# Patient Record
Sex: FEMALE | Race: WHITE | Employment: PART TIME | URBAN - METROPOLITAN AREA
[De-identification: names, ages, dates, MRNs, and addresses within clinical notes are randomized per-mention and may not be internally consistent; named-entity substitution may affect disease eponyms.]

---

## 2017-01-04 ENCOUNTER — ROUTINE PRENATAL (OUTPATIENT)
Dept: MIDWIFE SERVICES | Age: 27
End: 2017-01-04

## 2017-01-04 VITALS — BODY MASS INDEX: 30.18 KG/M2 | DIASTOLIC BLOOD PRESSURE: 76 MMHG | SYSTOLIC BLOOD PRESSURE: 120 MMHG | WEIGHT: 165 LBS

## 2017-01-04 DIAGNOSIS — Z34.93 ENCOUNTER FOR SUPERVISION OF NORMAL PREGNANCY IN THIRD TRIMESTER, UNSPECIFIED GRAVIDITY: Primary | ICD-10-CM

## 2017-01-04 NOTE — PROGRESS NOTES
Centering Session 7    Feeling well. Denies complaints. Reports good FM. Denies UC's, menstrual-like cramping, VB or LOF. Complaints of excess thirst.  Drinking 8 glasses of water daily. VSS. See prenatal flowsheet for maternal and fetal exam    IUP at 36w1d  S=D    Plan:  GBS today  Extra visit next week  [place] care reviewed, including sleeping safety, bathing safety, feeding safety, immunizations, diapering, responding to infant cues, circumcision, SIDs, and other general safety concerns r/t /. Discussed selection of Pediatrician. Reviewed fetal movement counts.   RTO 2 weeks

## 2017-01-08 LAB
B-HEM STREP SPEC QL CULT: NEGATIVE
GRBS, EXTERNAL: NEGATIVE

## 2017-01-11 ENCOUNTER — ROUTINE PRENATAL (OUTPATIENT)
Dept: MIDWIFE SERVICES | Age: 27
End: 2017-01-11

## 2017-01-11 VITALS
WEIGHT: 165 LBS | HEART RATE: 108 BPM | BODY MASS INDEX: 30.36 KG/M2 | DIASTOLIC BLOOD PRESSURE: 82 MMHG | SYSTOLIC BLOOD PRESSURE: 135 MMHG | TEMPERATURE: 98.1 F | HEIGHT: 62 IN

## 2017-01-11 DIAGNOSIS — Z34.83 ENCOUNTER FOR SUPERVISION OF OTHER NORMAL PREGNANCY IN THIRD TRIMESTER: Primary | ICD-10-CM

## 2017-01-11 NOTE — PROGRESS NOTES
Chief Complaint   Patient presents with    Routine Prenatal Visit     37w1d     Visit Vitals    /82    Pulse (!) 108    Temp 98.1 °F (36.7 °C) (Oral)    Ht 5' 2\" (1.575 m)    Wt 165 lb (74.8 kg)    BMI 30.18 kg/m2     1. Have you been to the ER, urgent care clinic since your last visit? Hospitalized since your last visit? No    2. Have you seen or consulted any other health care providers outside of the [address] [place] [address] since your last visit? Include any pap smears or colon screening.  No

## 2017-01-11 NOTE — PROGRESS NOTES
S: Feeling well. Consistent, daily fetal mvmt. No ctxs, LOF, or vaginal bldng. Continues to reports increased thirst. Also reports occasional tingling in bilateral hands. States it is worse in the morning and improves throughout day. States she is having occasional BH contractions, not painful. Denies LOF and VB.    O: See prenatal flowsheet for maternal and fetal exam  Blood pressure 135/82, pulse (!) 108, temperature 98.1 °F (36.7 °C), temperature source Oral, height 5' 2\" (1.575 m), weight 165 lb (74.8 kg). A:  37w1d   Size=Dates    P:   Reviewed normal GDM screen and normalcy of increased thirst during pregnancy due to increased blood volume. Encouraged her to continue hydrating liberally. Discussed caporal tunnel symptoms in pregnancy and normalcy. Discussed wrist braces at night to help decrease symptoms. Declines at this time due to finances. Reviewed BH contractions and normalcy of them. Discussed labor and s/s of it. Verbalized understanding. Reviewed common pregnancy changes and discomfort as well as comfort measures. See prenatal checklist for other topics covered during visit today  RTO in 1 weeks for centering.

## 2017-01-16 ENCOUNTER — ROUTINE PRENATAL (OUTPATIENT)
Dept: MIDWIFE SERVICES | Age: 27
End: 2017-01-16

## 2017-01-16 VITALS
HEIGHT: 62 IN | HEART RATE: 98 BPM | TEMPERATURE: 97.6 F | BODY MASS INDEX: 30.18 KG/M2 | WEIGHT: 164 LBS | DIASTOLIC BLOOD PRESSURE: 86 MMHG | SYSTOLIC BLOOD PRESSURE: 129 MMHG

## 2017-01-16 DIAGNOSIS — Z3A.37 37 WEEKS GESTATION OF PREGNANCY: ICD-10-CM

## 2017-01-16 DIAGNOSIS — A63.0 CONDYLOMA: ICD-10-CM

## 2017-01-16 DIAGNOSIS — Z34.03 ENCOUNTER FOR SUPERVISION OF NORMAL FIRST PREGNANCY IN THIRD TRIMESTER: Primary | ICD-10-CM

## 2017-01-16 DIAGNOSIS — Z23 ENCOUNTER FOR IMMUNIZATION: ICD-10-CM

## 2017-01-16 NOTE — PROGRESS NOTES
RETURN OB VISIT SUMMARY    Subjective:    31 y.o.    37w6d with has no unusual complaints except for \"growth on mons\", Denies LOF, dysuria, bleeding or cramping. Reports normal fetal movement. She istolerating her diet and is taking PNV on a regular basis. She has not noted a change in fetal position. Objective:    Physical Exam:  Visit Vitals    /86    Pulse 98    Temp 97.6 °F (36.4 °C) (Oral)    Ht 5' 2\" (1.575 m)    Wt 164 lb (74.4 kg)    LMP 2016 (Approximate)    BMI 30 kg/m2     Also, see prenatal vitals flowsheet. Patient without distress. Fundus: soft and non tender   MONS:  Dark raised lesion midline approximately 2 to 2.5 cm cephalad of vaginal opening. swabbed for identification  Perineum: blood absent, amniotic fluid absent    Lab Results   Component Value Date/Time    HGB 11.9 11/10/2016 10:18 AM    HCT 35.6 11/10/2016 10:18 AM    PLATELET 699  10:18 AM    Gestational Diabetes Screen 124 11/10/2016 10:18 AM    Hgb, External 13.8 2016    Platelet cnt., External 286 x10E3/uL 2016    Antibody screen, External Negative 2016       Immunization History   Administered Date(s) Administered    Influenza Vaccine (Quad) PF 2016    Tdap 2017     Flu Shot given  Tdap 28-32 wks given    Assessment/Plan:    Michelle Vergara was seen today for routine prenatal visit. Diagnoses and all orders for this visit:    Encounter for supervision of normal first pregnancy in third trimester    Condyloma  -     IGP, APTIMA HPV    Encounter for immunization  -     TETANUS, [address] [place] [address] (TDAP), IN INDIVIDS. >=7, IM  -     UT IMMUNIZ ADMIN,1 SINGLE/COMB VAC/TOXOID    37 weeks gestation of pregnancy      Follow-up Disposition:  Return in about 1 week (around 2017) for RTOB.

## 2017-01-16 NOTE — PROGRESS NOTES
Verbal order for HPV of suprapubic per dr. Joenathan Meckel  After obtaining consent, and per orders of Dr. Cindy Coker, injection of tdap given in right arm by Candice Lomeli RN. Patient instructed to remain in clinic for 20 minutes afterwards, and to report any adverse reaction to me immediately. Lot: [id number] Exp: 05/20/19 NDC: 28718-654-20 , VIS given.

## 2017-01-16 NOTE — PROGRESS NOTES
Chief Complaint   Patient presents with    Routine Prenatal Visit     37w6d     Visit Vitals    /86    Pulse 98    Temp 97.6 °F (36.4 °C) (Oral)    Ht 5' 2\" (1.575 m)    Wt 164 lb (74.4 kg)    BMI 30 kg/m2     1. Have you been to the ER, urgent care clinic since your last visit? Hospitalized since your last visit? No    2. Have you seen or consulted any other health care providers outside of the [address] [place] [address] since your last visit? Include any pap smears or colon screening.  No

## 2017-01-19 ENCOUNTER — TELEPHONE (OUTPATIENT)
Dept: MIDWIFE SERVICES | Age: 27
End: 2017-01-19

## 2017-01-19 LAB — SPECIMEN STATUS REPORT, ROLRST: NORMAL

## 2017-01-19 NOTE — TELEPHONE ENCOUNTER
This midwife noticed client has seen Dr Afua Parks for last appt and has next appt scheduled with him. Called client to see if there was an issue with midwifery care, or if she was desiring to change to Dr. Afua Parks. Client reports her friends and family have told her she needs a physician and she is considering switching to him. Discussed that changing is fine, but we need to know so that the appropriate person attends her labor and delivery. She verbalized understanding and reports she will think on it and let let us know soon.

## 2017-01-24 ENCOUNTER — ROUTINE PRENATAL (OUTPATIENT)
Dept: MIDWIFE SERVICES | Age: 27
End: 2017-01-24

## 2017-01-24 VITALS
HEART RATE: 99 BPM | WEIGHT: 164 LBS | DIASTOLIC BLOOD PRESSURE: 89 MMHG | BODY MASS INDEX: 30.18 KG/M2 | HEIGHT: 62 IN | SYSTOLIC BLOOD PRESSURE: 128 MMHG

## 2017-01-24 DIAGNOSIS — Z34.03 ENCOUNTER FOR SUPERVISION OF NORMAL FIRST PREGNANCY IN THIRD TRIMESTER: Primary | ICD-10-CM

## 2017-01-24 NOTE — PROGRESS NOTES
Chief Complaint   Patient presents with    Routine Prenatal Visit     39 weeks     Visit Vitals    /89    Pulse 99    Ht 5' 2\" (1.575 m)    Wt 164 lb (74.4 kg)    BMI 30 kg/m2     1. Have you been to the ER, urgent care clinic since your last visit? Hospitalized since your last visit? No    2. Have you seen or consulted any other health care providers outside of the [address] [place] [address] since your last visit? Include any pap smears or colon screening. No     Here today for a routine prenatal visit and she has no complaints or concerns.   She would like her cervix checked since she didn't have it checked last week

## 2017-01-24 NOTE — PATIENT INSTRUCTIONS
Week 39 of Your Pregnancy: Care Instructions  Your Care Instructions    During these final weeks, you may feel anxious to see your new baby. [place] babies often look different from what you see in pictures or movies. Right after birth, their heads may have a strange shape. Their eyes may be puffy. And their genitals may be swollen. They may also have very dry skin, or red marks on the eyelids, nose, or neck. Still, most parents think their babies are beautiful. Follow-up care is a key part of your treatment and safety. Be sure to make and go to all appointments, and call your doctor if you are having problems. It's also a good idea to know your test results and keep a list of the medicines you take. How can you care for yourself at home? Prepare to breastfeed  · If you are breastfeeding, continue to eat healthy foods. · Avoid alcohol, cigarettes, and drugs. This includes prescription and over-the-counter medicines. · You can help prevent sore nipples if you feed your baby in the correct position. Nurses will help you learn to do this. · Your  will need to be fed about every 1½ to 3 hours. Choose the right birth control after your baby is born  · Women who are breastfeeding can still get pregnant. Use birth control if you don't want to get pregnant. · Intrauterine devices (IUDs) work for women who want to wait at least 2 years before getting pregnant again. They are safe to use while you are breastfeeding. · Depo-Provera can be used while you are breastfeeding. It is a shot you get every 3 months. · Birth control pills work well. But you need a different kind of pill while you are breastfeeding. And when you start taking these pills, you need to make sure to use another type of birth control until you start your second pack. · Diaphragms, cervical caps, tubal implants, and condoms with spermicide work less well after birth.  If you have a diaphragm or cervical cap, you will need to have it refitted. · Tubal ligation (tying your tubes) and vasectomy are both permanent. These are good options if you are sure you are done having children. Where can you learn more? Go to http://shara-mary.info/. Enter E616 in the search box to learn more about \"Week 39 of Your Pregnancy: Care Instructions. \"  Current as of: May 30, 2016  Content Version: 11.1  © 9169-6567 [employer]. Care instructions adapted under license by [employer] (which disclaims liability or warranty for this information). If you have questions about a medical condition or this instruction, always ask your healthcare professional. [address] any warranty or liability for your use of this information.

## 2017-01-27 LAB
SPECIMEN STATUS REPORT, ROLRST: NORMAL
SPECIMEN/REQUEST PROBLEM, 100867: NORMAL

## 2017-01-31 ENCOUNTER — ROUTINE PRENATAL (OUTPATIENT)
Dept: MIDWIFE SERVICES | Age: 27
End: 2017-01-31

## 2017-01-31 VITALS
WEIGHT: 169.8 LBS | SYSTOLIC BLOOD PRESSURE: 138 MMHG | BODY MASS INDEX: 31.25 KG/M2 | HEART RATE: 88 BPM | DIASTOLIC BLOOD PRESSURE: 83 MMHG | HEIGHT: 62 IN

## 2017-01-31 DIAGNOSIS — Z34.03 ENCOUNTER FOR SUPERVISION OF NORMAL FIRST PREGNANCY IN THIRD TRIMESTER: Primary | ICD-10-CM

## 2017-01-31 NOTE — PROGRESS NOTES
Chief Complaint   Patient presents with    Routine Prenatal Visit     40weeks     Visit Vitals    /83    Pulse 88    Ht 5' 2\" (1.575 m)    Wt 169 lb 12.8 oz (77 kg)    BMI 31.06 kg/m2     1. Have you been to the ER, urgent care clinic since your last visit? Hospitalized since your last visit? No    2. Have you seen or consulted any other health care providers outside of the [address] [place] [address] since your last visit? Include any pap smears or colon screening. No     Here today for a routine prenatal visit and she has no complaints or concerns.

## 2017-01-31 NOTE — MR AVS SNAPSHOT
Visit Information Date & Time Provider Department Dept. Phone Encounter #  
 1/31/2017  2:40 PM MD Guillermo Hannon [facility] [phone] [id number] Your Appointments 4/24/2017 10:00 AM  
CENTERING PREGNANCY with Viviane Quiles, CNTEDDY [address] ([address]) Appt Note: G6/S9- Birth Stories [address] [phone]  
  
   
 [address] [place] [address] [address] Upcoming Health Maintenance Date Due  
 PAP AKA CERVICAL CYTOLOGY 9/1/2010 Allergies as of 1/31/2017  Review Complete On: 1/31/2017 By: Kulwinder Hernández RN No Known Allergies Current Immunizations  Never Reviewed Name Date Influenza Vaccine (Quad) PF 9/27/2016 Tdap 1/16/2017 Not reviewed this visit Vitals BP Pulse Height(growth percentile) Weight(growth percentile) LMP BMI  
 138/83 88 5' 2\" (1.575 m) 169 lb 12.8 oz (77 kg) 04/29/2016 (Approximate) 31.06 kg/m2 OB Status Smoking Status Pregnant Former Smoker BMI and BSA Data Body Mass Index Body Surface Area 31.06 kg/m 2 1.84 m 2 Preferred Pharmacy Pharmacy Name Phone [facility] [address] [place] [address] [phone] Your Updated Medication List  
  
   
This list is accurate as of: 1/31/17  3:18 PM.  Always use your most recent med list.  
  
  
  
  
 PRENATAL GUMMY PO Take  by mouth. Introducing [address]! Nazario Marni introduces [employer] patient portal. Now you can access parts of your medical record, email your doctor's office, and request medication refills online. 1. In your internet browser, go to [email or web address] 2. Click on the First Time User? Click Here link in the Sign In box. You will see the New Member Sign Up page. 3. Enter your [employer] Access Code exactly as it appears below. You will not need to use this code after youve completed the sign-up process. If you do not sign up before the expiration date, you must request a new code. · [employer] Access Code: [email or web address] Expires: 3/19/2017 10:09 AM 
 
4. Enter the last four digits of your Social Security Number (xxxx) and Date of Birth (mm/dd/yyyy) as indicated and click Submit. You will be taken to the next sign-up page. 5. Create a [employer] ID. This will be your [employer] login ID and cannot be changed, so think of one that is secure and easy to remember. 6. Create a [employer] password. You can change your password at any time. 7. Enter your Password Reset Question and Answer. This can be used at a later time if you forget your password. 8. Enter your e-mail address. You will receive e-mail notification when new information is available in [address] 9. Click Sign Up. You can now view and download portions of your medical record. 10. Click the Download Summary menu link to download a portable copy of your medical information. If you have questions, please visit the Frequently Asked Questions section of the [employer] website. Remember, [employer] is NOT to be used for urgent needs. For medical emergencies, dial 911. Now available from your iPhone and Android! Please provide this summary of care documentation to your next provider. Your primary care clinician is listed as NONE. If you have any questions after today's visit, please call [phone].

## 2017-02-01 NOTE — PROGRESS NOTES
RETURN OB VISIT SUMMARY    Subjective:    31 y.o.    40w0d with has no unusual complaints, Denies LOF, dysuria, bleeding or cramping. Reports normal fetal movement. She istolerating her diet and is taking PNV on a regular basis. She has not noted a change in fetal position. Objective:    Physical Exam:  Visit Vitals    /89    Pulse 99    Ht 5' 2\" (1.575 m)    Wt 164 lb (74.4 kg)    LMP 2016 (Approximate)    BMI 30 kg/m2     Also, see prenatal vitals flowsheet. Patient without distress. Fundus: soft and non tender  Perineum: blood absent, amniotic fluid absent  Cervical Exam: Closed/Thick/High    Lab Results   Component Value Date/Time    HGB 11.9 11/10/2016 10:18 AM    HCT 35.6 11/10/2016 10:18 AM    PLATELET 743  10:18 AM    Gestational Diabetes Screen 124 11/10/2016 10:18 AM    Hgb, External 13.8 2016    Platelet cnt., External 286 x10E3/uL 2016    Antibody screen, External Negative 2016       Immunization History   Administered Date(s) Administered    Influenza Vaccine (Quad) PF 2016    Tdap 2017     Flu Shot given  Tdap 28-32 wks given    Assessment/Plan:    Michelle Vergara was seen today for routine prenatal visit. Diagnoses and all orders for this visit:    Encounter for supervision of normal first pregnancy in third trimester      Follow-up Disposition:  Return in about 1 week (around 2017) for RTOB.

## 2017-02-01 NOTE — PROGRESS NOTES
RETURN OB VISIT SUMMARY    Subjective:    31 y.o.    40w0d with has no unusual complaints, Denies LOF, dysuria, bleeding or cramping. Reports normal fetal movement. She istolerating her diet and is taking PNV on a regular basis. She has not noted a change in fetal position. Objective:    Physical Exam:  Visit Vitals    /83    Pulse 88    Ht 5' 2\" (1.575 m)    Wt 169 lb 12.8 oz (77 kg)    LMP 2016 (Approximate)    BMI 31.06 kg/m2     Also, see prenatal vitals flowsheet. Patient without distress. Fundus: soft and non tender  Perineum: blood absent, amniotic fluid absent  Cervical Exam: Closed/Thick/High    Lab Results   Component Value Date/Time    HGB 11.9 11/10/2016 10:18 AM    HCT 35.6 11/10/2016 10:18 AM    PLATELET 005  10:18 AM    Gestational Diabetes Screen 124 11/10/2016 10:18 AM    Hgb, External 13.8 2016    Platelet cnt., External 286 x10E3/uL 2016    Antibody screen, External Negative 2016       Immunization History   Administered Date(s) Administered    Influenza Vaccine (Quad) PF 2016    Tdap 2017     Flu Shot given  Tdap 28-32 wks given    Assessment/Plan:    Deirdre Dominguez was seen today for routine prenatal visit. Diagnoses and all orders for this visit:    Encounter for supervision of normal first pregnancy in third trimester  Will discuss cervical ripening and possible induction of labor at the next visit. Follow-up Disposition:  Return in about 1 week (around 2017) for RTOB.

## 2017-02-06 ENCOUNTER — TELEPHONE (OUTPATIENT)
Dept: OBGYN | Age: 27
End: 2017-02-06

## 2017-02-06 NOTE — TELEPHONE ENCOUNTER
I called the pt after receiving the page. The pt has an IOL tomorrow am at [facility] but her medical record have not been sent yet. i informed the pt that the office is already closed and the pt needs to call back tomorrow morning.     Dickson Seals MD  5:55 PM  2/6/2017

## 2022-07-05 ENCOUNTER — OFFICE VISIT (OUTPATIENT)
Dept: OBGYN CLINIC | Age: 32
End: 2022-07-05
Payer: MEDICAID

## 2022-07-05 VITALS — DIASTOLIC BLOOD PRESSURE: 81 MMHG | BODY MASS INDEX: 23.96 KG/M2 | SYSTOLIC BLOOD PRESSURE: 115 MMHG | WEIGHT: 131 LBS

## 2022-07-05 DIAGNOSIS — Z01.419 ROUTINE GYNECOLOGICAL EXAMINATION: Primary | ICD-10-CM

## 2022-07-05 PROCEDURE — 99395 PREV VISIT EST AGE 18-39: CPT | Performed by: OBSTETRICS & GYNECOLOGY

## 2022-07-05 RX ORDER — DEXTROAMPHETAMINE SACCHARATE, AMPHETAMINE ASPARTATE, DEXTROAMPHETAMINE SULFATE AND AMPHETAMINE SULFATE 5; 5; 5; 5 MG/1; MG/1; MG/1; MG/1
20 TABLET ORAL 2 TIMES DAILY
COMMUNITY
Start: 2022-06-25

## 2022-07-05 RX ORDER — SERTRALINE HYDROCHLORIDE 100 MG/1
100 TABLET, FILM COATED ORAL DAILY
COMMUNITY
Start: 2022-06-21

## 2022-07-05 RX ORDER — CLONAZEPAM 2 MG/1
TABLET ORAL
COMMUNITY
Start: 2022-06-25

## 2022-07-05 NOTE — PROGRESS NOTES
HISTORY OF PRESENT ILLNESS  Iesha Bobo is a 27 y.o. female who presents today for the following:  Chief Complaint   Patient presents with    Annual Exam   Patient is a 79-year-old G2,  female who presented today for routine annual exam.  She is without complaints on presentation today.     No Known Allergies    Current Outpatient Medications   Medication Sig    clonazePAM (KlonoPIN) 2 mg tablet TAKE 1 TABLET UP TO 2 TIMES DAILY AS NEEDED FOR ANXIETY    dextroamphetamine-amphetamine (ADDERALL) 20 mg tablet Take 20 mg by mouth two (2) times a day.  sertraline (ZOLOFT) 100 mg tablet Take 100 mg by mouth daily. No current facility-administered medications for this visit. Past Medical History:   Diagnosis Date    Abnormal Papanicolaou smear of cervix     10/2014 abnormal, colpo normal, 2016 normal    Ectopic pregnancy     Kidney disease     Kidney stones, chronic       Past Surgical History:   Procedure Laterality Date    HX OTHER SURGICAL      [place] Teeth removed , 3/2016    HX OTHER SURGICAL      Laparoscopy for ectopic pregnancy     HX OTHER SURGICAL      Bone marrow biopsy 2016       Family History   Problem Relation Age of Onset    Other Maternal Grandmother     Other Paternal Grandfather        Social History     Socioeconomic History    Marital status: SINGLE     Spouse name: Not on file    Number of children: Not on file    Years of education: Not on file    Highest education level: Not on file   Occupational History    Not on file   Tobacco Use    Smoking status: Current Every Day Smoker     Years: 8.00     Types: Cigarettes     Last attempt to quit: 6/15/2016     Years since quittin.0    Smokeless tobacco: Never Used   Substance and Sexual Activity    Alcohol use:  Yes    Drug use: No    Sexual activity: Not Currently     Partners: Male     Birth control/protection: None   Other Topics Concern    Not on file   Social History Narrative    Not on file     Social Determinants of Health     Financial Resource Strain:     Difficulty of Paying Living Expenses: Not on file   Food Insecurity:     Worried About Running Out of Food in the Last Year: Not on file    Brando of Food in the Last Year: Not on file   Transportation Needs:     Lack of Transportation (Medical): Not on file    Lack of Transportation (Non-Medical): Not on file   Physical Activity:     Days of Exercise per Week: Not on file    Minutes of Exercise per Session: Not on file   Stress:     Feeling of Stress : Not on file   Social Connections:     Frequency of Communication with Friends and Family: Not on file    Frequency of Social Gatherings with Friends and Family: Not on file    Attends Latter day Services: Not on file    Active Member of [address] [place] [address] or Organizations: Not on file    Attends Club or Organization Meetings: Not on file    Marital Status: Not on file   Intimate Partner Violence:     Fear of Current or Ex-Partner: Not on file    Emotionally Abused: Not on file    Physically Abused: Not on file    Sexually Abused: Not on file   Housing Stability:     Unable to Pay for Housing in the Last Year: Not on file    Number of [place] in the Last Year: Not on file    Unstable Housing in the Last Year: Not on file           REVIEW OF SYSTEMS     Constitutional: Negative for chills, fever and malaise/fatigue. HENT: Negative for congestion, hearing loss and sore throat. Respiratory: Negative for cough, sputum production, shortness of breath and wheezing. Cardiovascular: Negative for chest pain. Gastrointestinal: Negative for abdominal pain, constipation, diarrhea, nausea and vomiting. Genitourinary: Negative for dysuria, flank pain, hematuria and urgency. Neurological: Negative for dizziness, loss of consciousness, weakness and headaches. Psychiatric/Behavioral: Negative for depression.      PHYSICAL EXAM  /81 (BP 1 Location: Right upper arm, BP Patient Position: Sitting, BP Cuff Size: Small adult)   Wt 131 lb (59.4 kg)   LMP 06/28/2022 (Approximate)   BMI 23.96 kg/m²      Patient is a well-developed well-nourished female no apparent distress  She is alert and oriented x3  Head is normocephalic atraumatic pupils equal round react light accommodation  Neck is supple without adenopathy or thyromegaly  Heart is with regular rate and rhythm without murmurs rubs or gallops  Lungs are clear to auscultation and percussion bilaterally  Breasts are without masses bilaterally  Abdomen is soft nontender nondistended bowel sounds are present and active  Extremities are without clubbing cyanosis or edema  Pulses are full and symmetric bilaterally  Pelvic  External genitalia within normal limits  Urethra is midline there are no apparent urethral lesions the bladder is within normal limits  Vagina is with normal rugae there is minimal discharge present in the vaginal vault  Cervix is parous, there are no apparent cervical lesions, there is no cervical motion tenderness  Uterus is normal size and contours  Adnexa are without masses    ASSESSMENT and PLAN  Normal annual exam  Plan: Pap performed, follow-up as needed and yearly  No results found for this visit on 07/05/22. Orders Placed This Encounter    clonazePAM (KlonoPIN) 2 mg tablet     Sig: TAKE 1 TABLET UP TO 2 TIMES DAILY AS NEEDED FOR ANXIETY    dextroamphetamine-amphetamine (ADDERALL) 20 mg tablet     Sig: Take 20 mg by mouth two (2) times a day.  sertraline (ZOLOFT) 100 mg tablet     Sig: Take 100 mg by mouth daily.

## 2022-07-07 LAB
C TRACH RRNA CVX QL NAA+PROBE: NEGATIVE
CYTOLOGIST CVX/VAG CYTO: NORMAL
CYTOLOGY CVX/VAG DOC CYTO: NORMAL
CYTOLOGY CVX/VAG DOC THIN PREP: NORMAL
DX ICD CODE: NORMAL
LABCORP, 190119: NORMAL
Lab: NORMAL
N GONORRHOEA RRNA CVX QL NAA+PROBE: NEGATIVE
OTHER STN SPEC: NORMAL
STAT OF ADQ CVX/VAG CYTO-IMP: NORMAL
T VAGINALIS RRNA SPEC QL NAA+PROBE: NEGATIVE

## 2024-05-12 ENCOUNTER — HOSPITAL ENCOUNTER (EMERGENCY)
Facility: HOSPITAL | Age: 34
Discharge: HOME | End: 2024-05-12
Payer: MEDICAID

## 2024-05-12 VITALS
HEIGHT: 62 IN | SYSTOLIC BLOOD PRESSURE: 128 MMHG | BODY MASS INDEX: 25.76 KG/M2 | TEMPERATURE: 98 F | WEIGHT: 140 LBS | DIASTOLIC BLOOD PRESSURE: 86 MMHG | OXYGEN SATURATION: 100 % | RESPIRATION RATE: 18 BRPM | HEART RATE: 75 BPM

## 2024-05-12 DIAGNOSIS — N30.00 ACUTE CYSTITIS WITHOUT HEMATURIA: Primary | ICD-10-CM

## 2024-05-12 DIAGNOSIS — H18.891 CORNEAL IRRITATION OF RIGHT EYE: ICD-10-CM

## 2024-05-12 LAB
APPEARANCE UR: ABNORMAL
BACTERIA URNS QL MICRO: NEGATIVE /HPF
BILIRUB UR QL: NEGATIVE
COLOR UR: ABNORMAL
EPITH CASTS URNS QL MICRO: ABNORMAL /LPF
GLUCOSE UR STRIP.AUTO-MCNC: NEGATIVE MG/DL
HCG UR QL: NEGATIVE
HGB UR QL STRIP: NEGATIVE
KETONES UR QL STRIP.AUTO: NEGATIVE MG/DL
LEUKOCYTE ESTERASE UR QL STRIP.AUTO: ABNORMAL
MUCOUS THREADS URNS QL MICRO: ABNORMAL /LPF
NITRITE UR QL STRIP.AUTO: NEGATIVE
PH UR STRIP: 7 (ref 5–8)
PROT UR STRIP-MCNC: NEGATIVE MG/DL
RBC #/AREA URNS HPF: ABNORMAL /HPF (ref 0–5)
SP GR UR REFRACTOMETRY: 1.01 (ref 1–1.03)
URINE CULTURE IF INDICATED: ABNORMAL
UROBILINOGEN UR QL STRIP.AUTO: 0.1 EU/DL (ref 0.1–1)
WBC URNS QL MICRO: >100 /HPF (ref 0–4)

## 2024-05-12 PROCEDURE — 87186 SC STD MICRODIL/AGAR DIL: CPT

## 2024-05-12 PROCEDURE — 36415 COLL VENOUS BLD VENIPUNCTURE: CPT

## 2024-05-12 PROCEDURE — 99283 EMERGENCY DEPT VISIT LOW MDM: CPT

## 2024-05-12 PROCEDURE — 6370000000 HC RX 637 (ALT 250 FOR IP): Performed by: NURSE PRACTITIONER

## 2024-05-12 PROCEDURE — 87088 URINE BACTERIA CULTURE: CPT

## 2024-05-12 PROCEDURE — 81001 URINALYSIS AUTO W/SCOPE: CPT

## 2024-05-12 PROCEDURE — 81025 URINE PREGNANCY TEST: CPT

## 2024-05-12 PROCEDURE — 87086 URINE CULTURE/COLONY COUNT: CPT

## 2024-05-12 RX ORDER — TETRACAINE HYDROCHLORIDE 5 MG/ML
1 SOLUTION OPHTHALMIC ONCE
Status: COMPLETED | OUTPATIENT
Start: 2024-05-12 | End: 2024-05-12

## 2024-05-12 RX ORDER — CEFDINIR 300 MG/1
300 CAPSULE ORAL 2 TIMES DAILY
Qty: 14 CAPSULE | Refills: 0 | Status: SHIPPED | OUTPATIENT
Start: 2024-05-12 | End: 2024-05-19

## 2024-05-12 RX ORDER — CIPROFLOXACIN HYDROCHLORIDE 3.5 MG/ML
1 SOLUTION/ DROPS TOPICAL
Qty: 2.5 ML | Refills: 1 | Status: SHIPPED | OUTPATIENT
Start: 2024-05-12 | End: 2024-05-20

## 2024-05-12 RX ADMIN — TETRACAINE HYDROCHLORIDE 1 DROP: 5 SOLUTION OPHTHALMIC at 14:16

## 2024-05-12 RX ADMIN — FLUORESCEIN SODIUM 1 MG: 1 STRIP OPHTHALMIC at 14:16

## 2024-05-12 ASSESSMENT — LIFESTYLE VARIABLES
HOW OFTEN DO YOU HAVE A DRINK CONTAINING ALCOHOL: NEVER
HOW MANY STANDARD DRINKS CONTAINING ALCOHOL DO YOU HAVE ON A TYPICAL DAY: PATIENT DOES NOT DRINK

## 2024-05-12 ASSESSMENT — PAIN - FUNCTIONAL ASSESSMENT
PAIN_FUNCTIONAL_ASSESSMENT: 0-10

## 2024-05-12 ASSESSMENT — PAIN SCALES - GENERAL
PAINLEVEL_OUTOF10: 8
PAINLEVEL_OUTOF10: 8

## 2024-05-12 NOTE — ED TRIAGE NOTES
Pt complains of pain and burning during urination x 2 months. Pt also complains of right eye pain since yesterday.

## 2024-05-12 NOTE — ED NOTES
Pt was crafting yesterday and thinks maybe something like glue or glitter got in there. Also pt wears contacts and did not take then out overnight.. Pt felt the scratching feeling when she woke up. Contacts are now out.

## 2024-05-12 NOTE — ED NOTES
Discharge instructions reviewed with pt and pt indicated understanding. Pt ambulated out with all belongings and a steady gait at this time.

## 2024-05-12 NOTE — ED PROVIDER NOTES
reactive to light.   Cardiovascular:      Rate and Rhythm: Normal rate and regular rhythm.      Heart sounds: Normal heart sounds.   Pulmonary:      Effort: Pulmonary effort is normal.   Abdominal:      Palpations: Abdomen is soft.   Musculoskeletal:      Cervical back: Normal range of motion.   Skin:     General: Skin is warm and dry.   Neurological:      General: No focal deficit present.      Mental Status: She is alert and oriented to person, place, and time.   Psychiatric:         Mood and Affect: Mood normal.           SCREENINGS                  LAB, EKG AND DIAGNOSTIC RESULTS   Labs:  No results found for this or any previous visit (from the past 12 hour(s)).      EKG: Not Applicable    Radiologic Studies:  Non-plain film images such as CT, Ultrasound and MRI are read by the radiologist. Plain radiographic images are visualized and preliminarily interpreted by the ED Physician with the following findings: Not Applicable.    Interpretation per the Radiologist below, if available at the time of this note:  No orders to display        ED COURSE and DIFFERENTIAL DIAGNOSIS/MDM   2:06 PM Differential and Considerations: Patient presents with dysuria and urinary frequency. Stable vitals and benign abdominal exam. DDx: Acute cystitis, pyleonephritis, ureteral stone, STI.  Will obtain UA and treat accordingly. If patient expresses concern for STI exposure, will test and empirically treat. Also, I provided education on the importance of testing and treating partners and using safe sex practices in the future.    Discussed with the patient diagnosis and test results and all questioned fully answered.  They understand the importance of staying well hydrated, taking antibiotics as prescribed to completion.  She will follow-up with PCP if any problems arise.      Patient presents with eye pain.  Differential diagnosis includes viral conjunctivitis, bacterial conjunctivitis, allergies, foreign body, orbital fracture.

## 2024-05-14 LAB
BACTERIA SPEC CULT: ABNORMAL
COLONY COUNT, CNT: ABNORMAL
Lab: ABNORMAL